# Patient Record
Sex: FEMALE | Race: WHITE | HISPANIC OR LATINO | URBAN - METROPOLITAN AREA
[De-identification: names, ages, dates, MRNs, and addresses within clinical notes are randomized per-mention and may not be internally consistent; named-entity substitution may affect disease eponyms.]

---
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
## 2022-03-31 ENCOUNTER — APPOINTMENT (OUTPATIENT)
Dept: NEUROLOGY | Facility: CLINIC | Age: 22
End: 2022-03-31
Payer: MEDICAID

## 2022-03-31 ENCOUNTER — NON-APPOINTMENT (OUTPATIENT)
Age: 22
End: 2022-03-31

## 2022-03-31 VITALS
HEART RATE: 84 BPM | DIASTOLIC BLOOD PRESSURE: 74 MMHG | WEIGHT: 158 LBS | SYSTOLIC BLOOD PRESSURE: 120 MMHG | BODY MASS INDEX: 29.08 KG/M2 | HEIGHT: 62 IN

## 2022-03-31 DIAGNOSIS — Z82.0 FAMILY HISTORY OF EPILEPSY AND OTHER DISEASES OF THE NERVOUS SYSTEM: ICD-10-CM

## 2022-03-31 PROCEDURE — 99205 OFFICE O/P NEW HI 60 MIN: CPT

## 2022-03-31 RX ORDER — LEVONORGESTREL AND ETHINYL ESTRADIOL 0.1-0.02MG
0.1-2 KIT ORAL DAILY
Qty: 1 | Refills: 3 | Status: DISCONTINUED | COMMUNITY
Start: 2019-11-19 | End: 2022-03-31

## 2022-03-31 RX ORDER — QUETIAPINE 50 MG/1
50 TABLET, FILM COATED ORAL
Refills: 0 | Status: ACTIVE | COMMUNITY

## 2022-03-31 RX ORDER — NORETHINDRONE AND ETHINYL ESTRADIOL 0.5-0.035
0.5-35 KIT ORAL DAILY
Qty: 1 | Refills: 3 | Status: DISCONTINUED | COMMUNITY
Start: 2020-01-14 | End: 2022-03-31

## 2022-03-31 RX ORDER — LEVONORGESTREL AND ETHINYL ESTRADIOL 0.1-0.02MG
0.1-2 KIT ORAL
Qty: 1 | Refills: 1 | Status: DISCONTINUED | COMMUNITY
Start: 2020-03-08 | End: 2022-03-31

## 2022-03-31 NOTE — HISTORY OF PRESENT ILLNESS
[FreeTextEntry1] : 21-year-old woman who is here for initial consultation of headache that started last year.\par Location unilateral or holocephalic\par Quality throbbing\par Severity 8 out of 10\par Frequency once a week\par Duration the whole night.  Patient states that sometimes it wakes her up from sleep.\par Associated with nausea, photophobia phonophobia, no TACs and no aura\par She has tried Tylenol, Motrin, Advil, Aleve

## 2022-03-31 NOTE — DISCUSSION/SUMMARY
[FreeTextEntry1] : 21-year-old woman who is here for initial consultation of headaches that are likely migraine without aura.  Patient was advised to try sumatriptan as needed.  Patient will also obtain MRI of the brain as these are new onset headaches with ability to wake her up in the morning.  Patient will follow up with me after the studies are completed.\par \par I spent the time noted on the day of this patient encounter preparing for, providing and documenting the above E/M service and counseling and educate patient on differential, workup, disease course, and treatment/management. Education was provided to the patient during this encounter. All questions and concerns were answered and addressed in detail. The patient verbalized understanding and agreed to plan. Patient was advised to continue to monitor for neurologic symptoms and to notify my office or go to the nearest emergency room if there are any changes. Any orders/referrals and communications were provided as well. \par Side effects of the above medications were discussed in detail including but not limited to applicable black box warning and teratogenicity as appropriate. \par Patient was advised to bring previous records to my office. \par \par \par

## 2022-04-06 ENCOUNTER — OUTPATIENT (OUTPATIENT)
Dept: OUTPATIENT SERVICES | Facility: HOSPITAL | Age: 22
LOS: 1 days | End: 2022-04-06
Payer: MEDICAID

## 2022-04-06 ENCOUNTER — APPOINTMENT (OUTPATIENT)
Dept: MRI IMAGING | Facility: HOSPITAL | Age: 22
End: 2022-04-06
Payer: MEDICAID

## 2022-04-06 ENCOUNTER — NON-APPOINTMENT (OUTPATIENT)
Age: 22
End: 2022-04-06

## 2022-04-06 DIAGNOSIS — R51.9 HEADACHE, UNSPECIFIED: ICD-10-CM

## 2022-04-06 PROCEDURE — 70551 MRI BRAIN STEM W/O DYE: CPT

## 2022-04-06 PROCEDURE — 70551 MRI BRAIN STEM W/O DYE: CPT | Mod: 26

## 2022-05-04 ENCOUNTER — APPOINTMENT (OUTPATIENT)
Dept: NEUROLOGY | Facility: CLINIC | Age: 22
End: 2022-05-04
Payer: MEDICAID

## 2022-05-04 VITALS
DIASTOLIC BLOOD PRESSURE: 80 MMHG | SYSTOLIC BLOOD PRESSURE: 118 MMHG | BODY MASS INDEX: 29.08 KG/M2 | HEIGHT: 62 IN | WEIGHT: 158 LBS | HEART RATE: 84 BPM

## 2022-05-04 DIAGNOSIS — R51.9 HEADACHE, UNSPECIFIED: ICD-10-CM

## 2022-05-04 PROCEDURE — 99215 OFFICE O/P EST HI 40 MIN: CPT

## 2022-05-04 RX ORDER — SUMATRIPTAN 50 MG/1
50 TABLET, FILM COATED ORAL
Qty: 1 | Refills: 3 | Status: ACTIVE | COMMUNITY
Start: 2022-03-31 | End: 1900-01-01

## 2022-05-04 NOTE — DISCUSSION/SUMMARY
[FreeTextEntry1] : 21-year-old woman who is here for follow-up of her headaches that are likely migraine without aura versus caffeine withdrawal headaches.  Patient will try a higher dose of sumatriptan 50 mg as needed.  If that does not work may need to try rizatriptan.  Patient was advised to cut down on amount of soda she drinks as caffeine withdrawal headaches might be another component of her headaches.  Patient will follow up with me in 3 months if not earlier.\par \par I spent the time noted on the day of this patient encounter preparing for, providing and documenting the above E/M service and counseling and educate patient on differential, workup, disease course, and treatment/management. Education was provided to the patient during this encounter. All questions and concerns were answered and addressed in detail. The patient verbalized understanding and agreed to plan. Patient was advised to continue to monitor for neurologic symptoms and to notify my office or go to the nearest emergency room if there are any changes. Any orders/referrals and communications were provided as well. \par Side effects of the above medications were discussed in detail including but not limited to applicable black box warning and teratogenicity as appropriate. \par Patient was advised to bring previous records to my office. \par \par \par

## 2022-05-04 NOTE — HISTORY OF PRESENT ILLNESS
[FreeTextEntry1] : 21-year-old woman who is here for initial consultation of headache that started last year.\par Location unilateral or holocephalic\par Quality throbbing\par Severity 8 out of 10\par Frequency once a week\par Duration the whole night.  Patient states that sometimes it wakes her up from sleep.\par Associated with nausea, photophobia phonophobia, no TACs and no aura\par She has tried Tylenol, Motrin, Advil, Aleve\par \par Interval history: Since her last visit patient's MRI of the brain was unremarkable.  Patient tried the Imitrex however it did not help alleviate her headache.  Patient experiences no side effects from the medication.  Patient states that the severity of her headache is not as bad as before.  The last one was about a week ago.  On further evaluation patient also drinks soda every day.  Patient was advised that another cause of her headache may be caffeine withdrawal headaches and she was advised to taper off the soda.

## 2022-05-09 ENCOUNTER — APPOINTMENT (OUTPATIENT)
Dept: OBGYN | Facility: CLINIC | Age: 22
End: 2022-05-09
Payer: MEDICAID

## 2022-05-09 ENCOUNTER — TRANSCRIPTION ENCOUNTER (OUTPATIENT)
Age: 22
End: 2022-05-09

## 2022-05-09 ENCOUNTER — NON-APPOINTMENT (OUTPATIENT)
Age: 22
End: 2022-05-09

## 2022-05-09 VITALS
SYSTOLIC BLOOD PRESSURE: 120 MMHG | WEIGHT: 158 LBS | OXYGEN SATURATION: 98 % | TEMPERATURE: 98 F | BODY MASS INDEX: 29.08 KG/M2 | HEART RATE: 86 BPM | DIASTOLIC BLOOD PRESSURE: 60 MMHG | HEIGHT: 62 IN

## 2022-05-09 DIAGNOSIS — F52.5 VAGINISMUS NOT DUE TO A SUBSTANCE OR KNOWN PHYSIOLOGICAL CONDITION: ICD-10-CM

## 2022-05-09 DIAGNOSIS — N94.2 VAGINISMUS: ICD-10-CM

## 2022-05-09 LAB
HCG UR QL: NEGATIVE
QUALITY CONTROL: YES

## 2022-05-09 PROCEDURE — 99395 PREV VISIT EST AGE 18-39: CPT

## 2022-06-06 NOTE — HISTORY OF PRESENT ILLNESS
[FreeTextEntry1] : 20 yo P0 with history of pelvic pain with intercourse. Specifically, feels pain with initial penetration and even has difficulty with insertion of tampons. Reviewed lubrication and correct insertion of tampon.\par \par vaginismus? stars, \par POB:denies\par PGYN: reg\par PMH: denies\par PSH: denies\par Med: per MAR\par All: nkma\par SH: denies\par \par

## 2022-06-06 NOTE — PHYSICAL EXAM
[Chaperone Present] : A chaperone was present in the examining room during all aspects of the physical examination [Appropriately responsive] : appropriately responsive [Alert] : alert [No Acute Distress] : no acute distress [Soft] : soft [Non-tender] : non-tender [Non-distended] : non-distended [No HSM] : No HSM [No Lesions] : no lesions [No Mass] : no mass [Oriented x3] : oriented x3 [Labia Majora] : normal [Labia Minora] : normal [Normal] : normal [Uterine Adnexae] : normal [FreeTextEntry4] : tenderness at introitus

## 2022-06-06 NOTE — PLAN
[FreeTextEntry1] : \par \par I spent the time noted on the day of this patient encounter preparing for, providing and documenting the above E/M service and counseling and educate patient on differential, workup, disease course, and treatment/management. Education was provided to the patient during this encounter. All questions and concerns were answered and addressed in detail.\par \par Violette Dupont MD\par

## 2022-08-04 ENCOUNTER — APPOINTMENT (OUTPATIENT)
Dept: NEUROLOGY | Facility: CLINIC | Age: 22
End: 2022-08-04

## 2022-08-09 ENCOUNTER — APPOINTMENT (OUTPATIENT)
Dept: OBGYN | Facility: CLINIC | Age: 22
End: 2022-08-09

## 2022-09-16 ENCOUNTER — EMERGENCY (EMERGENCY)
Facility: HOSPITAL | Age: 22
LOS: 1 days | Discharge: HOME | End: 2022-09-16
Attending: INTERNAL MEDICINE | Admitting: INTERNAL MEDICINE
Payer: MEDICAID

## 2022-09-16 VITALS
HEART RATE: 80 BPM | DIASTOLIC BLOOD PRESSURE: 91 MMHG | RESPIRATION RATE: 16 BRPM | SYSTOLIC BLOOD PRESSURE: 134 MMHG | OXYGEN SATURATION: 99 %

## 2022-09-16 VITALS
RESPIRATION RATE: 18 BRPM | SYSTOLIC BLOOD PRESSURE: 155 MMHG | HEART RATE: 87 BPM | DIASTOLIC BLOOD PRESSURE: 78 MMHG | WEIGHT: 164.91 LBS | TEMPERATURE: 98 F | HEIGHT: 62 IN | OXYGEN SATURATION: 99 %

## 2022-09-16 PROCEDURE — 71045 X-RAY EXAM CHEST 1 VIEW: CPT | Mod: 26

## 2022-09-16 PROCEDURE — 99284 EMERGENCY DEPT VISIT MOD MDM: CPT

## 2022-09-16 PROCEDURE — 72040 X-RAY EXAM NECK SPINE 2-3 VW: CPT | Mod: 26

## 2022-09-16 PROCEDURE — 93005 ELECTROCARDIOGRAM TRACING: CPT

## 2022-09-16 PROCEDURE — 72040 X-RAY EXAM NECK SPINE 2-3 VW: CPT

## 2022-09-16 PROCEDURE — 71045 X-RAY EXAM CHEST 1 VIEW: CPT

## 2022-09-16 PROCEDURE — 93010 ELECTROCARDIOGRAM REPORT: CPT

## 2022-09-16 RX ORDER — IBUPROFEN 200 MG
1 TABLET ORAL
Qty: 40 | Refills: 0
Start: 2022-09-16 | End: 2022-09-25

## 2022-09-16 RX ORDER — IBUPROFEN 200 MG
600 TABLET ORAL ONCE
Refills: 0 | Status: COMPLETED | OUTPATIENT
Start: 2022-09-16 | End: 2022-09-16

## 2022-09-16 RX ORDER — LIDOCAINE 4 G/100G
1 CREAM TOPICAL
Qty: 20 | Refills: 0
Start: 2022-09-16 | End: 2022-09-25

## 2022-09-16 RX ORDER — LIDOCAINE 4 G/100G
1 CREAM TOPICAL ONCE
Refills: 0 | Status: COMPLETED | OUTPATIENT
Start: 2022-09-16 | End: 2022-09-16

## 2022-09-16 RX ADMIN — LIDOCAINE 1 PATCH: 4 CREAM TOPICAL at 17:09

## 2022-09-16 RX ADMIN — Medication 600 MILLIGRAM(S): at 15:39

## 2022-09-16 NOTE — ED PROVIDER NOTE - CARE PROVIDER_API CALL
Gustavo Abdalla)  Orthopaedic Sports Medicine; Orthopaedic Surgery  [address] [address]  Phone: [phone]  Fax: [phone]  Follow Up Time:

## 2022-09-16 NOTE — ED PROVIDER NOTE - OBJECTIVE STATEMENT
21 y f cc L UL pain L chest flickering  1 week hx of psychiatric problen depression on lexapro and seraquel , no trauma

## 2022-09-16 NOTE — ED PROVIDER NOTE - PHYSICAL EXAMINATION
General:     NAD, well-nourished, well-appearing  Head:     NC/AT, EOMI, oral mucosa moist  Neck:     trachea midline  Lungs:     CTA b/l, no w/r/r  CVS:     S1S2, RRR, no m/g/r  Abd:     +BS, s/nt/nd, no organomegaly  Ext:    2+ radial and pedal pulses, no c/c/e  L lateral ant elbow tenderness  Neuro: AAOx3, no sensory/motor deficits

## 2022-09-16 NOTE — ED ADULT NURSE NOTE - OBJECTIVE STATEMENT
left arm pain for 1 week, intermittent and also reporting tingling, denies injury. Skin warm dry color pink. good ROM. = Strength

## 2022-09-16 NOTE — ED PROVIDER NOTE - CARE PLAN
Principal Discharge DX:	Atypical chest pain  Secondary Diagnosis:	Tennis elbow  Secondary Diagnosis:	Cervical radiculopathy   1

## 2022-09-16 NOTE — ED PROVIDER NOTE - PATIENT PORTAL LINK FT
You can access the FollowMyHealth Patient Portal offered by [facility] by registering at the following website: [email or web address]. By joining [employer]’s FollowMyHealth portal, you will also be able to view your health information using other applications (apps) compatible with our system.

## 2022-09-16 NOTE — ED PROVIDER NOTE - NSFOLLOWUPINSTRUCTIONS_ED_ALL_ED_FT
Follow up with your PMD within 1-2 days.  Rest, increase your fluids, advance your activity as tolerated.   Take all of your other medications as previously prescribed.   Worsening, continued or ANY new concerning symptoms return to the emergency department.  warm compressed arm   motrin, lidocaine patch

## 2022-10-21 NOTE — ED ADULT NURSE NOTE - HISTORY OF COVID-19 VACCINATION
Impression: Open angle with borderline findings, low risk, bilateral
No FHx No Sulfa allergies 24-2 HVF
   OD:
   OS: 
RNFL OCT (10/21/22) OD: BL temporal, others WNL
   OS: WNL Pachs (10/21/22) OD: 484 OS: 490 Plan: Discussed findings, IOP's 16/16 RTC 6mo for IOP and possible 24-2HVF No

## 2023-04-26 NOTE — ED BEHAVIORAL HEALTH ASSESSMENT NOTE - DESCRIPTION
XR WRIST 3 OR MORE VIEWS RIGHT    Result Date: 4/26/2023  Narrative: DICTATING PHYSICIAN:  Beto Gavin M.D. EXAM:  XR WRIST 3 OR MORE VIEWS RIGHT, 4/26/2023 12:40 PM HISTORY: Wrist pain after fall. COMPARISON: None.     Impression: FINDINGS/IMPRESSION: Diffuse osseus demineralization limits evaluation.  Questionable osseous fragment projecting over the dorsum of the midcarpal joint.  Findings could represent triquetral avulsion fracture versus heterotopic bone, loose body or chondrocalcinosis.  Otherwise, no acute fracture or dislocation.  No destructive osseous lesions.  Severe osteoarthrosis at the thumb carpometacarpal and triscaphe joints.  Chondrocalcinosis at the TFCC, radiocarpal and midcarpal joints which can be seen in CPPD..  Severe calcified radial and ulnar artery atherosclerosis. Electronically Signed by: BETO GAVIN MD Signed on: 4/26/2023 1:05 PM Workstation ID: [id number]    CT CHEST WO CONTRAST    Result Date: 4/26/2023  Narrative: EXAM: CT CHEST WO CONTRAST CLINICAL INDICATION: Fall.  Rib fracture.  Chest trauma. COMPARISON:  None. TECHNIQUE: Noncontrast axial images of the chest are obtained.  MA and/or kVp was adjusted for patient size. FINDINGS: Right shoulder advanced arthritic changes are seen.  There is no axillary lymphadenopathy seen.  Left subclavian pacemaker device noted. Median sternotomy changes.  The heart size is within normal limits. Coronary artery calcification seen.  Ascending aorta measures 4 cm in diameter.  The esophagus is mildly distended with small air-fluid level noted.  A suture line in the mid esophagus is seen.  Findings suggests gastric pull-up.  No adrenal mass.  Bilateral renal cysts.  Moderate colonic stool.  Colonic diverticulosis.  No focal lesions in the pancreas seen.  A duodenal diverticulum is seen. The trachea and the central bronchi are patent.  Minimal biapical pleuroparenchymal thickening is seen.  Lateral right upper lobe nodule measures  1.5 mm on image 20.  Bilateral lower lobe interstitial opacities are noted.  Right lower lobe subsegmental atelectasis.  7.5 mm right lower lobe pleural-based nodule at the right costophrenic angle seen on image 100.  Small left pleural effusion.  Left lower lobe interstitial opacities are noted.  Groundglass changes are noted.  No pneumothorax is seen. No compression deformity in the visualized spine seen.  Mild right convex thoracic scoliosis is seen.  Old healed right 7th rib fracture.  No acute displaced rib fracture is seen.     Impression: Old right rib fractures seen.  No acute displaced rib fracture is seen. The esophagus is mildly distended with small air-fluid level noted.  A suture line in the mid esophagus is seen.  Findings suggests gastric pull-up.  7.5 mm right lower lobe pleural-based nodule at the right costophrenic angle seen on image 100.  Small left pleural effusion.  Electronically Signed by: JASON JACKSON MD Signed on: 4/26/2023 9:33 AM Workstation ID: [id number]    XR SHOULDER 2 VIEWS RIGHT    Result Date: 4/26/2023  Narrative: DICTATING PHYSICIAN:  Beto Couch M.D. EXAM:  XR SHOULDER 2 VIEWS RIGHT, 4/26/2023 8:26 AM HISTORY: fall COMPARISON: None.     Impression: FINDINGS/IMPRESSION: Evaluation is limited by nonstandard positioning.  Severe diffuse osseous demineralization.  No radiographically apparent fracture.  Suspected superior translation of the humeral head relative to the glenoid with impaction and remodeling of the undersurface of the acromion, consistent with sequela of chronic rotator cuff arthropathy.  Evaluation of the glenohumeral joint is limited, however advanced osteoarthrosis is suspected.  Moderate osteoarthrosis at the acromioclavicular joint.  Soft tissues are unremarkable. Electronically Signed by: BETO COUCH MD Signed on: 4/26/2023 9:26 AM Workstation ID: [id number]    CT HEAD WO CONTRAST    Result Date: 4/26/2023  Narrative: EXAMINATION: Computed  tomography (CT) of the head without contrast HISTORY: Head pain after trauma. TECHNIQUE: CT of the head was performed without contrast according to standard protocol. COMPARISON: No prior study available for comparison. FINDINGS: No acute intra- or extra-axial fluid collections are identified. There is mild cerebral volume loss with associated ex vacuo ventricular enlargement. The basilar cisterns are patent. No mass effect or midline shift is seen. There is an area of encephalomalacia in the right occipital lobe likely related to a chronic infarction in the right posterior cerebral artery territory. There is an area of hypoattenuation in the posterior right parietal lobe which is less specific and may represent gliosis rather than vasogenic edema. There is vascular calcification of the carotid siphons. There is an acute appearing right zygomatic maxillary complex (ZMC) fracture comprising of fractures of the right orbital inferior and lateral walls, maxillary sinus anterior and posterior walls, and zygomatic arch. There is hemorrhagic fluid in the right maxillary sinus. No acute intraorbital injury is identified. Bilateral cataract extractions are noted. The mastoids appear normal No acute fracture is identified.     Impression: 1. No acute intracranial hemorrhage. 2. Acute right zygomaticomaxillary complex (ZMC) fractures including fractures of the right orbital and maxillary sinus walls. No intraorbital injury. 3. Encephalomalacia in the right occipital lobe likely related to a chronic infarction. Hypoattenuation in the posterior right parietal lobe likely from gliosis. Given unusual appearance, recommend correlation with prior outside imaging or non-urgent MRI. Electronically Signed by: SONJA DALE MD Signed on: 4/26/2023 7:20 AM Workstation ID: [id number]    CT FACIAL BONES WO CONTRAST    Result Date: 4/26/2023  Narrative: Patient: GEGE BAILEY  Time Out: 03:34 Exam(s): CT FACIAL Without Contrast EXAM:    CT Maxillofacial Without Intravenous Contrast CLINICAL HISTORY:    Reason for exam: Facial trauma.. TECHNIQUE:   Axial computed tomography images of the face without intravenous contrast.  Dose reduction techniques were utilized. COMPARISON:   No relevant prior studies available. FINDINGS:   Bones/joints:  Nondisplaced fracture of the right zygomatic arch.  Fractures of the floor and lateral wall of the right orbit.  Fractures of the anterior and posterior lateral walls of the right maxillary antrum.   Soft tissues:  Unremarkable.   Orbits:  Unremarkable.   Sinuses:  Fluid is seen in the right maxillary sinus.     Impression: Fractures of the right zygomatic arch, right lateral orbit wall, right orbital floor and walls of the right maxillary sinus. Electronically signed by Pranav Soriano MD on 04 26 23 at 03:34    XR ELBOW 2 VIEWS RIGHT    Result Date: 4/26/2023  Narrative: Patient: GEGE BAILEY  Time Out: 01:53 Exam(s): XR RIGHT ELBOW EXAM:   XR Right Elbow Complete, 3 or More Views CLINICAL HISTORY:    Reason for exam: fall. TECHNIQUE:   Frontal, lateral and oblique views of the right elbow. COMPARISON:   No relevant prior studies available. FINDINGS:   Bones/joints:  Joint effusion.  No acute fracture is clearly identified.   No dislocation.   Soft tissues:  Unremarkable.     Impression: Joint effusion.  No clear evidence of acute fracture. Electronically signed by Pranav Soriano MD on 04 26 23 at 01:53    XR CHEST AP OR PA    Result Date: 4/26/2023  Narrative: Patient: GEGE BAILEY  Time Out: 01:51 Exam(s): XR CXR 1 VIEW EXAM:   XR Chest, 1 View CLINICAL HISTORY:    Reason for exam: fall. TECHNIQUE:   Frontal view of the chest. COMPARISON:   No relevant prior studies available. FINDINGS:   Lungs:  Unremarkable.  No acute infiltration, atelectasis or mass.   Pleural space:  Unremarkable.  No pneumothorax or pleural fluid.   Heart:  Unremarkable.  No cardiomegaly.   Mediastinum:  Unremarkable.    Bones/joints:  Bilateral loss of acromiohumeral spaces in the shoulders likely indicating chronic rotator cuff pathology.   Vasculature:  The aortic knob is prominent.   Tubes, lines and devices:  There is an implanted cardiac pacer defibrillator.     Impression:   No acute findings in the chest. Electronically signed by Pranav Soriano MD on 04 26 23 at 01:51     Labs Reviewed   BASIC METABOLIC PANEL - Abnormal; Notable for the following components:       Result Value    Glucose 106 (*)     BUN 24 (*)     BUN/Cr 32 (*)     All other components within normal limits   CBC WITH AUTOMATED DIFFERENTIAL (PERFORMABLE ONLY) - Abnormal; Notable for the following components:    RBC 4.35 (*)     .5 (*)     RDW-SD 50.1 (*)     Absolute Lymphocytes 0.9 (*)     All other components within normal limits    Narrative:     This is an appended report.  These results have been appended to a previously verified report.   PROTHROMBIN TIME - Normal   TROPONIN I, HIGH SENSITIVITY - Normal   CBC WITH DIFFERENTIAL    Narrative:     The following orders were created for panel order CBC with Automated Differential.  Procedure                               Abnormality         Status                     ---------                               -----------         ------                     CBC with Automated Dif...[[id number]]  Abnormal            Final result                 Please view results for these tests on the individual orders.   TYPE/SCREEN   ABO/RH CONFIRMATION        calm, cooperative, engaged    Vital Signs Last 24 Hrs  T(C): 37.3 (03 Jan 2018 13:27), Max: 37.3 (03 Jan 2018 13:27)  T(F): 99.1 (03 Jan 2018 13:27), Max: 99.1 (03 Jan 2018 13:27)  HR: 92 (03 Jan 2018 13:27) (92 - 92)  BP: 130/83 (03 Jan 2018 13:27) (130/83 - 130/83)  BP(mean): --  RR: 22 (03 Jan 2018 13:27) (22 - 22)  SpO2: 100% (03 Jan 2018 13:27) (100% - 100%) denies lives with parents

## 2023-05-03 ENCOUNTER — EMERGENCY (EMERGENCY)
Facility: HOSPITAL | Age: 23
LOS: 1 days | Discharge: HOME | End: 2023-05-03
Attending: STUDENT IN AN ORGANIZED HEALTH CARE EDUCATION/TRAINING PROGRAM | Admitting: STUDENT IN AN ORGANIZED HEALTH CARE EDUCATION/TRAINING PROGRAM
Payer: MEDICAID

## 2023-05-03 VITALS
WEIGHT: 166.89 LBS | SYSTOLIC BLOOD PRESSURE: 136 MMHG | HEIGHT: 62 IN | DIASTOLIC BLOOD PRESSURE: 89 MMHG | RESPIRATION RATE: 16 BRPM | TEMPERATURE: 98 F | HEART RATE: 83 BPM | OXYGEN SATURATION: 98 %

## 2023-05-03 PROBLEM — F32.9 MAJOR DEPRESSIVE DISORDER, SINGLE EPISODE, UNSPECIFIED: Chronic | Status: ACTIVE | Noted: 2022-09-16

## 2023-05-03 PROBLEM — F41.9 ANXIETY DISORDER, UNSPECIFIED: Chronic | Status: ACTIVE | Noted: 2022-09-16

## 2023-05-03 PROCEDURE — 99284 EMERGENCY DEPT VISIT MOD MDM: CPT

## 2023-05-03 PROCEDURE — 70486 CT MAXILLOFACIAL W/O DYE: CPT | Mod: 26,MA

## 2023-05-03 PROCEDURE — 99284 EMERGENCY DEPT VISIT MOD MDM: CPT | Mod: 25

## 2023-05-03 PROCEDURE — 70486 CT MAXILLOFACIAL W/O DYE: CPT | Mod: MA

## 2023-05-03 RX ORDER — SERTRALINE 25 MG/1
0 TABLET, FILM COATED ORAL
Qty: 0 | Refills: 0 | DISCHARGE

## 2023-05-03 RX ORDER — QUETIAPINE FUMARATE 200 MG/1
0 TABLET, FILM COATED ORAL
Qty: 0 | Refills: 0 | DISCHARGE

## 2023-05-03 NOTE — ED PROVIDER NOTE - CLINICAL SUMMARY MEDICAL DECISION MAKING FREE TEXT BOX
CT results discussed, patient to f/u with PCP for outpatient MRI   No acute traumatic injuries  contusion to left cheek

## 2023-05-03 NOTE — ED PROVIDER NOTE - PHYSICAL EXAMINATION
VITAL SIGNS: I have reviewed nursing notes and confirm.  CONSTITUTIONAL: well-appearing, non-toxic, NAD  SKIN: Warm dry, normal skin turgor  HEAD: NC, L cheek contusion   CARD: RRR, no murmurs, rubs or gallops  RESP: clear to ausculation b/l.  No rales, rhonchi, or wheezing.  ABD: soft, + BS, non-tender, non-distended, no rebound or guarding. No CVA tenderness  EXT: Full ROM, no bony tenderness, no pedal edema, no calf tenderness  NEURO: normal motor. normal sensory. CN II-XII intact. Cerebellar testing normal. Normal gait.  PSYCH: Cooperative, appropriate.

## 2023-05-03 NOTE — ED PROVIDER NOTE - PATIENT PORTAL LINK FT
You can access the FollowMyHealth Patient Portal offered by [facility] by registering at the following website: [email or web address]. By joining [employer]’s FollowMyHealth portal, you will also be able to view your health information using other applications (apps) compatible with our system.
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
## 2023-12-03 ENCOUNTER — NON-APPOINTMENT (OUTPATIENT)
Age: 23
End: 2023-12-03

## 2024-01-15 ENCOUNTER — NON-APPOINTMENT (OUTPATIENT)
Age: 24
End: 2024-01-15

## 2024-02-02 ENCOUNTER — EMERGENCY (EMERGENCY)
Facility: HOSPITAL | Age: 24
LOS: 1 days | Discharge: HOME | End: 2024-02-02
Attending: EMERGENCY MEDICINE | Admitting: EMERGENCY MEDICINE
Payer: COMMERCIAL

## 2024-02-02 VITALS
WEIGHT: 167.55 LBS | RESPIRATION RATE: 18 BRPM | SYSTOLIC BLOOD PRESSURE: 145 MMHG | HEART RATE: 96 BPM | HEIGHT: 62 IN | TEMPERATURE: 98 F | OXYGEN SATURATION: 98 % | DIASTOLIC BLOOD PRESSURE: 99 MMHG

## 2024-02-02 LAB
ALBUMIN SERPL ELPH-MCNC: 4 G/DL — SIGNIFICANT CHANGE UP (ref 3.3–5)
ALP SERPL-CCNC: 99 U/L — SIGNIFICANT CHANGE UP (ref 40–120)
ALT FLD-CCNC: 23 U/L — SIGNIFICANT CHANGE UP (ref 10–45)
ANION GAP SERPL CALC-SCNC: 10 MMOL/L — SIGNIFICANT CHANGE UP (ref 5–17)
APPEARANCE UR: ABNORMAL
AST SERPL-CCNC: 22 U/L — SIGNIFICANT CHANGE UP (ref 10–40)
BASOPHILS # BLD AUTO: 0.07 K/UL — SIGNIFICANT CHANGE UP (ref 0–0.2)
BASOPHILS NFR BLD AUTO: 0.7 % — SIGNIFICANT CHANGE UP (ref 0–2)
BILIRUB SERPL-MCNC: 0.3 MG/DL — SIGNIFICANT CHANGE UP (ref 0.2–1.2)
BILIRUB UR-MCNC: NEGATIVE — SIGNIFICANT CHANGE UP
BUN SERPL-MCNC: 14 MG/DL — SIGNIFICANT CHANGE UP (ref 7–23)
CALCIUM SERPL-MCNC: 9.4 MG/DL — SIGNIFICANT CHANGE UP (ref 8.4–10.5)
CHLORIDE SERPL-SCNC: 104 MMOL/L — SIGNIFICANT CHANGE UP (ref 96–108)
CO2 SERPL-SCNC: 27 MMOL/L — SIGNIFICANT CHANGE UP (ref 22–31)
COLOR SPEC: YELLOW — SIGNIFICANT CHANGE UP
CREAT SERPL-MCNC: 0.45 MG/DL — LOW (ref 0.5–1.3)
DIFF PNL FLD: ABNORMAL
EGFR: 140 ML/MIN/1.73M2 — SIGNIFICANT CHANGE UP
EOSINOPHIL # BLD AUTO: 0.08 K/UL — SIGNIFICANT CHANGE UP (ref 0–0.5)
EOSINOPHIL NFR BLD AUTO: 0.8 % — SIGNIFICANT CHANGE UP (ref 0–6)
GLUCOSE SERPL-MCNC: 86 MG/DL — SIGNIFICANT CHANGE UP (ref 70–99)
GLUCOSE UR QL: NEGATIVE MG/DL — SIGNIFICANT CHANGE UP
HCT VFR BLD CALC: 34 % — LOW (ref 34.5–45)
HGB BLD-MCNC: 11.2 G/DL — LOW (ref 11.5–15.5)
HIV 1 & 2 AB SERPL IA.RAPID: SIGNIFICANT CHANGE UP
IMM GRANULOCYTES NFR BLD AUTO: 0.3 % — SIGNIFICANT CHANGE UP (ref 0–0.9)
KETONES UR-MCNC: NEGATIVE MG/DL — SIGNIFICANT CHANGE UP
LEUKOCYTE ESTERASE UR-ACNC: ABNORMAL
LIDOCAIN IGE QN: 32 U/L — SIGNIFICANT CHANGE UP (ref 16–77)
LYMPHOCYTES # BLD AUTO: 2.36 K/UL — SIGNIFICANT CHANGE UP (ref 1–3.3)
LYMPHOCYTES # BLD AUTO: 23.8 % — SIGNIFICANT CHANGE UP (ref 13–44)
MCHC RBC-ENTMCNC: 24.7 PG — LOW (ref 27–34)
MCHC RBC-ENTMCNC: 32.9 GM/DL — SIGNIFICANT CHANGE UP (ref 32–36)
MCV RBC AUTO: 75.1 FL — LOW (ref 80–100)
MONOCYTES # BLD AUTO: 0.58 K/UL — SIGNIFICANT CHANGE UP (ref 0–0.9)
MONOCYTES NFR BLD AUTO: 5.8 % — SIGNIFICANT CHANGE UP (ref 2–14)
NEUTROPHILS # BLD AUTO: 6.8 K/UL — SIGNIFICANT CHANGE UP (ref 1.8–7.4)
NEUTROPHILS NFR BLD AUTO: 68.6 % — SIGNIFICANT CHANGE UP (ref 43–77)
NITRITE UR-MCNC: NEGATIVE — SIGNIFICANT CHANGE UP
NRBC # BLD: 0 /100 WBCS — SIGNIFICANT CHANGE UP (ref 0–0)
PH UR: 5.5 — SIGNIFICANT CHANGE UP (ref 5–8)
PLATELET # BLD AUTO: 314 K/UL — SIGNIFICANT CHANGE UP (ref 150–400)
POTASSIUM SERPL-MCNC: 3.7 MMOL/L — SIGNIFICANT CHANGE UP (ref 3.5–5.3)
POTASSIUM SERPL-SCNC: 3.7 MMOL/L — SIGNIFICANT CHANGE UP (ref 3.5–5.3)
PROT SERPL-MCNC: 8.4 G/DL — HIGH (ref 6–8.3)
PROT UR-MCNC: NEGATIVE MG/DL — SIGNIFICANT CHANGE UP
RBC # BLD: 4.53 M/UL — SIGNIFICANT CHANGE UP (ref 3.8–5.2)
RBC # FLD: 14.5 % — SIGNIFICANT CHANGE UP (ref 10.3–14.5)
SODIUM SERPL-SCNC: 141 MMOL/L — SIGNIFICANT CHANGE UP (ref 135–145)
SP GR SPEC: 1.02 — SIGNIFICANT CHANGE UP (ref 1–1.03)
UROBILINOGEN FLD QL: 0.2 MG/DL — SIGNIFICANT CHANGE UP (ref 0.2–1)
WBC # BLD: 9.92 K/UL — SIGNIFICANT CHANGE UP (ref 3.8–10.5)
WBC # FLD AUTO: 9.92 K/UL — SIGNIFICANT CHANGE UP (ref 3.8–10.5)

## 2024-02-02 PROCEDURE — 76830 TRANSVAGINAL US NON-OB: CPT | Mod: 26

## 2024-02-02 PROCEDURE — 96375 TX/PRO/DX INJ NEW DRUG ADDON: CPT

## 2024-02-02 PROCEDURE — 76830 TRANSVAGINAL US NON-OB: CPT

## 2024-02-02 PROCEDURE — 99284 EMERGENCY DEPT VISIT MOD MDM: CPT | Mod: 25

## 2024-02-02 PROCEDURE — 80053 COMPREHEN METABOLIC PANEL: CPT

## 2024-02-02 PROCEDURE — 86703 HIV-1/HIV-2 1 RESULT ANTBDY: CPT

## 2024-02-02 PROCEDURE — 36415 COLL VENOUS BLD VENIPUNCTURE: CPT

## 2024-02-02 PROCEDURE — 96374 THER/PROPH/DIAG INJ IV PUSH: CPT

## 2024-02-02 PROCEDURE — 85025 COMPLETE CBC W/AUTO DIFF WBC: CPT

## 2024-02-02 PROCEDURE — 81001 URINALYSIS AUTO W/SCOPE: CPT

## 2024-02-02 PROCEDURE — 83690 ASSAY OF LIPASE: CPT

## 2024-02-02 PROCEDURE — 81025 URINE PREGNANCY TEST: CPT

## 2024-02-02 PROCEDURE — 99284 EMERGENCY DEPT VISIT MOD MDM: CPT

## 2024-02-02 RX ORDER — ONDANSETRON 8 MG/1
4 TABLET, FILM COATED ORAL ONCE
Refills: 0 | Status: COMPLETED | OUTPATIENT
Start: 2024-02-02 | End: 2024-02-02

## 2024-02-02 RX ORDER — SODIUM CHLORIDE 9 MG/ML
1000 INJECTION INTRAMUSCULAR; INTRAVENOUS; SUBCUTANEOUS ONCE
Refills: 0 | Status: COMPLETED | OUTPATIENT
Start: 2024-02-02 | End: 2024-02-02

## 2024-02-02 RX ORDER — KETOROLAC TROMETHAMINE 30 MG/ML
30 SYRINGE (ML) INJECTION ONCE
Refills: 0 | Status: DISCONTINUED | OUTPATIENT
Start: 2024-02-02 | End: 2024-02-02

## 2024-02-02 RX ADMIN — Medication 30 MILLIGRAM(S): at 22:29

## 2024-02-02 RX ADMIN — SODIUM CHLORIDE 1000 MILLILITER(S): 9 INJECTION INTRAMUSCULAR; INTRAVENOUS; SUBCUTANEOUS at 22:17

## 2024-02-02 RX ADMIN — ONDANSETRON 4 MILLIGRAM(S): 8 TABLET, FILM COATED ORAL at 22:29

## 2024-02-02 RX ADMIN — Medication 30 MILLIGRAM(S): at 22:46

## 2024-02-02 NOTE — ED ADULT NURSE NOTE - NSFALLUNIVINTERV_ED_ALL_ED
Bed/Stretcher in lowest position, wheels locked, appropriate side rails in place/Call bell, personal items and telephone in reach/Instruct patient to call for assistance before getting out of bed/chair/stretcher/Non-slip footwear applied when patient is off stretcher/[place] to call system/Physically safe environment - no spills, clutter or unnecessary equipment/Purposeful proactive rounding/Room/bathroom lighting operational, light cord in reach

## 2024-02-02 NOTE — ED PROVIDER NOTE - PHYSICAL EXAMINATION
exam:   General: well appearing, NAD.   HEENT: eyes perrl, nose normal, OP no erythema/exudate/swelling.   cor: RRR, s1s2, 2+rad pulses.   lungs: ctabl, no resp distress.   abd: soft, nondistended. slight LLQ ttp. no rebound/guarding. no cvat  neuro: a&ox3, cn2-12 intact, COLINDRES, 5/5 strength c nl sensation all extremities, nl coordination.   MSK: no extremity swelling.  Skin: normal, no rash

## 2024-02-02 NOTE — ED PROVIDER NOTE - OBJECTIVE STATEMENT
23-year-old female with no medical or surgical history complaining of acute onset left lower quadrant sharp pain at 6 PM tonight.  Associated with mild nausea.  No vomiting or diarrhea.  Had normal bowel movement today.  No hematuria dysuria no vaginal bleeding or discharge.  Denies pregnancy.  LMP 1/16.  No fever or chills.  No chest pain or shortness of breath.

## 2024-02-02 NOTE — ED ADULT NURSE NOTE - OBJECTIVE STATEMENT
pt presents to ED c/o LLQ since 6pm associated with nausea. .My  LMP is Jan 16" pt presents to ED c/o LLQ since 6pm associated with nausea. LMP is Jan 16. denies any cp, sob, fevers

## 2024-02-02 NOTE — ED PROVIDER NOTE - CLINICAL SUMMARY MEDICAL DECISION MAKING FREE TEXT BOX
23-year-old female with no medical or surgical history complaining of acute onset left lower quadrant sharp pain at 6 PM tonight.  Associated with mild nausea.  No vomiting or diarrhea.  Had normal bowel movement today.  No hematuria dysuria no vaginal bleeding or discharge.  Denies pregnancy.  LMP 1/16.  No fever or chills.  No chest pain or shortness of breath.    Patient well-appearing in no distress.  Vitals unremarkable.  Patient has slight left lower quadrant tenderness on exam.  Partial DDx: Ovarian cyst, ovarian torsion, cystitis colitis.  Check labs, urine, pelvic sonogram.  Toradol and Zofran for symptoms.  Reassess 23-year-old female with no medical or surgical history complaining of acute onset left lower quadrant sharp pain at 6 PM tonight.  Associated with mild nausea.  No vomiting or diarrhea.  Had normal bowel movement today.  No hematuria dysuria no vaginal bleeding or discharge.  Denies pregnancy.  LMP 1/16.  No fever or chills.  No chest pain or shortness of breath.    Patient well-appearing in no distress.  Vitals unremarkable.  Patient has slight left lower quadrant tenderness on exam.  Partial DDx: Ovarian cyst, ovarian torsion, cystitis colitis.  Check labs, urine, pelvic sonogram.  Toradol and Zofran for symptoms.  Reassess    Update: Labs unremarkable.  Ultrasound shows left ovarian cyst likely hemorrhagic.  Normal blood flow.  Patient states she is feeling much better after meds.  Abdomen currently nontender.  Follow-up with OB GYN

## 2024-02-02 NOTE — ED PROVIDER NOTE - NSFOLLOWUPINSTRUCTIONS_ED_ALL_ED_FT
-Take ibuprofen 400 to 600 mg every 6 hours as needed for pain  -Follow-up with your gynecologist in the next 1 to 2 weeks regarding left ovarian cyst  -  -Return to any emergency room for worsening pain or other concerns      Ovarian Cyst    WHAT YOU NEED TO KNOW:    An ovarian cyst is a fluid-filled sac that grows in or on an ovary. You have 2 ovaries, 1 on each side of your uterus. They are small, about the shape of an almond. Ovarian cysts are common in women who have regular monthly cycles. During your monthly cycle, eggs are released from the ovaries. The cyst usually contains fluid but may sometimes have blood or tissue in it. Most ovarian cysts are harmless and go away without treatment in a few months. Some cysts can grow large, cause pain, or break open.   Female Reproductive System         DISCHARGE INSTRUCTIONS:    Call your local emergency number (911 in the [place]) if:   •You have severe pain with fever and vomiting.      •You have sudden, severe abdominal pain.      •You are too weak, faint, or dizzy to stand up.      •You are breathing very quickly.      Call your doctor or gynecologist if:   •Your periods are early, late, or more painful than usual.      •You have questions or concerns about your condition or care.      Medicines: You may need any of the following:   •Birth control pills may help control your monthly cycle, prevent cysts, or cause them to shrink.      •Acetaminophen decreases pain and fever. It is available without a doctor's order. Ask how much to take and how often to take it. Follow directions. Read the labels of all other medicines you are using to see if they also contain acetaminophen, or ask your doctor or pharmacist. Acetaminophen can cause liver damage if not taken correctly.      •NSAIDs, such as ibuprofen, help decrease swelling, pain, and fever. This medicine is available with or without a doctor's order. NSAIDs can cause stomach bleeding or kidney problems in certain people. If you take blood thinner medicine, always ask your healthcare provider if NSAIDs are safe for you. Always read the medicine label and follow directions.      •Prescription pain medicine may be given. Ask your healthcare provider how to take this medicine safely. Some prescription pain medicines contain acetaminophen. Do not take other medicines that contain acetaminophen without talking to your healthcare provider. Too much acetaminophen may cause liver damage. Prescription pain medicine may cause constipation. Ask your healthcare provider how to prevent or treat constipation.       •Take your medicine as directed. Contact your healthcare provider if you think your medicine is not helping or if you have side effects. Tell your provider if you are allergic to any medicine. Keep a list of the medicines, vitamins, and herbs you take. Include the amounts, and when and why you take them. Bring the list or the pill bottles to follow-up visits. Carry your medicine list with you in case of an emergency.      Manage ovarian cysts: You can manage a current cyst and help healthcare providers find future cysts early.  •Apply heat to decrease pain and cramping from a cyst. Sit in a warm bath, or place a heating pad (turned on low) on your abdomen. Do this for 15 to 20 minutes every hour for comfort.      •Get regular pelvic exams or Pap smears. This will help providers find any new ovarian cysts. Tell your healthcare provider about any unusual changes in your monthly cycle.      Follow up with your doctor or gynecologist as directed: Write down your questions so you remember to ask them during your visits

## 2024-02-02 NOTE — ED PROVIDER NOTE - PATIENT PORTAL LINK FT
You can access the FollowMyHealth Patient Portal offered by [facility] by registering at the following website: [email or web address]. By joining [employer]’s FollowMyHealth portal, you will also be able to view your health information using other applications (apps) compatible with our system.

## 2024-02-03 VITALS
SYSTOLIC BLOOD PRESSURE: 141 MMHG | TEMPERATURE: 98 F | RESPIRATION RATE: 18 BRPM | OXYGEN SATURATION: 100 % | HEART RATE: 84 BPM | DIASTOLIC BLOOD PRESSURE: 101 MMHG

## 2024-04-03 NOTE — ED PROVIDER NOTE - CARE PROVIDER_API CALL
None
Jose Angel Baron  Obstetrics and Gynecology  [address] [place] [address], [address]  [place] [address]  Phone: [phone]  Fax: [phone]  Follow Up Time: 4-6 Days

## 2025-02-04 NOTE — ED ADULT NURSE NOTE - NS ED NOTE ABUSE SUSPICION NEGLECT YN
Referral received for patient.  Patient info is outlined below.    Referring provider: Garrett Henderson MD   Referring provider location [facility]  Diagnosis: Cancer of unknown origin  (CMD) [C80.1]   Demographics info received:  yes  Records Received with Referral:  yes  Records Requested: no  Urgency: Routine    Chart to be routed to JUAN Mcmahon [facility] Gyn Onc Nurse Pool for nurse review.    No

## 2025-03-28 ENCOUNTER — NON-APPOINTMENT (OUTPATIENT)
Age: 25
End: 2025-03-28

## 2025-03-28 ENCOUNTER — RESULT REVIEW (OUTPATIENT)
Age: 25
End: 2025-03-28

## 2025-04-17 NOTE — ED ADULT NURSE NOTE - NS ED NURSE LEVEL OF CONSCIOUSNESS SPEECH
Name of Medication(s) Requested:  Requested Prescriptions     Pending Prescriptions Disp Refills    lisinopril (PRINIVIL;ZESTRIL) 20 MG tablet [Pharmacy Med Name: LISINOPRIL 20MG TABLETS] 90 tablet 0     Sig: TAKE 1 TABLET BY MOUTH DAILY    atorvastatin (LIPITOR) 40 MG tablet [Pharmacy Med Name: ATORVASTATIN 40MG TABLETS] 30 tablet 0     Sig: TAKE 1 TABLET BY MOUTH EVERY NIGHT    traZODone (DESYREL) 50 MG tablet [Pharmacy Med Name: TRAZODONE 50MG TABLETS] 30 tablet 0     Sig: TAKE 1 TABLET BY MOUTH EVERY NIGHT       Medication is on current medication list Yes    Dosage and directions were verified? Yes    Quantity verified: 90 day supply     Pharmacy Verified?  Yes    Last Appointment:  3/6/2025    Future appts:  Future Appointments   Date Time Provider Department Center   6/11/2025 11:45 AM Silvana Braga MD [place] [facility] DEP        (If no appt send self scheduling link. .REFILLAPPT)  Scheduling request sent?     [] Yes  [] No    Does patient need updated?  [] Yes  [] No   Speaking Coherently

## 2025-06-07 ENCOUNTER — NON-APPOINTMENT (OUTPATIENT)
Age: 25
End: 2025-06-07

## 2025-06-09 ENCOUNTER — APPOINTMENT (OUTPATIENT)
Dept: OBGYN | Facility: CLINIC | Age: 25
End: 2025-06-09
Payer: COMMERCIAL

## 2025-06-09 VITALS
TEMPERATURE: 97.9 F | HEART RATE: 100 BPM | DIASTOLIC BLOOD PRESSURE: 80 MMHG | SYSTOLIC BLOOD PRESSURE: 142 MMHG | OXYGEN SATURATION: 98 % | HEIGHT: 62 IN | BODY MASS INDEX: 29.08 KG/M2 | WEIGHT: 158 LBS

## 2025-06-09 PROBLEM — Z01.419 WELL WOMAN EXAM WITH ROUTINE GYNECOLOGICAL EXAM: Status: ACTIVE | Noted: 2025-06-09

## 2025-06-09 PROBLEM — Z30.45 INITIAL ENCOUNTER FOR MANAGEMENT OF CONTRACEPTIVE PATCH USE: Status: ACTIVE | Noted: 2025-06-09

## 2025-06-09 PROBLEM — Z12.4 SCREENING FOR MALIGNANT NEOPLASM OF CERVIX: Status: ACTIVE | Noted: 2025-06-09

## 2025-06-09 PROBLEM — Z11.3 ROUTINE SCREENING FOR STI (SEXUALLY TRANSMITTED INFECTION): Status: ACTIVE | Noted: 2025-06-09

## 2025-06-09 LAB
HCG UR QL: NEGATIVE
QUALITY CONTROL: YES

## 2025-06-09 PROCEDURE — 99395 PREV VISIT EST AGE 18-39: CPT | Mod: 25

## 2025-06-09 PROCEDURE — 99459 PELVIC EXAMINATION: CPT

## 2025-06-09 PROCEDURE — 99204 OFFICE O/P NEW MOD 45 MIN: CPT | Mod: 25

## 2025-06-09 PROCEDURE — 81003 URINALYSIS AUTO W/O SCOPE: CPT | Mod: QW

## 2025-06-09 RX ORDER — NAPROXEN SODIUM 550 MG/1
550 TABLET ORAL
Qty: 60 | Refills: 1 | Status: ACTIVE | COMMUNITY
Start: 2025-06-09 | End: 1900-01-01

## 2025-06-09 RX ORDER — NORELGESTROMIN AND ETHINYL ESTRADIOL 150; 35 UG/D; UG/D
150-35 PATCH TRANSDERMAL
Qty: 12 | Refills: 3 | Status: ACTIVE | COMMUNITY
Start: 2025-06-09 | End: 1900-01-01

## 2025-06-11 LAB
C TRACH RRNA SPEC QL NAA+PROBE: NOT DETECTED
CANDIDA VAG CYTO: NOT DETECTED
G VAGINALIS+PREV SP MTYP VAG QL MICRO: NOT DETECTED
N GONORRHOEA RRNA SPEC QL NAA+PROBE: NOT DETECTED
SOURCE AMPLIFICATION: NORMAL
T VAGINALIS VAG QL WET PREP: NOT DETECTED

## 2025-06-13 LAB — CYTOLOGY CVX/VAG DOC THIN PREP: NORMAL

## 2025-07-02 ENCOUNTER — APPOINTMENT (OUTPATIENT)
Dept: ULTRASOUND IMAGING | Facility: HOSPITAL | Age: 25
End: 2025-07-02
Payer: COMMERCIAL

## 2025-07-02 ENCOUNTER — OUTPATIENT (OUTPATIENT)
Dept: OUTPATIENT SERVICES | Facility: HOSPITAL | Age: 25
LOS: 1 days | End: 2025-07-02
Payer: COMMERCIAL

## 2025-07-02 DIAGNOSIS — N94.6 DYSMENORRHEA, UNSPECIFIED: ICD-10-CM

## 2025-07-02 PROCEDURE — 76830 TRANSVAGINAL US NON-OB: CPT

## 2025-07-02 PROCEDURE — 76856 US EXAM PELVIC COMPLETE: CPT

## 2025-07-02 PROCEDURE — 76830 TRANSVAGINAL US NON-OB: CPT | Mod: 26

## 2025-07-04 ENCOUNTER — EMERGENCY (EMERGENCY)
Facility: HOSPITAL | Age: 25
LOS: 1 days | End: 2025-07-04
Attending: EMERGENCY MEDICINE | Admitting: EMERGENCY MEDICINE
Payer: COMMERCIAL

## 2025-07-04 VITALS
OXYGEN SATURATION: 100 % | TEMPERATURE: 98 F | HEART RATE: 98 BPM | DIASTOLIC BLOOD PRESSURE: 106 MMHG | RESPIRATION RATE: 18 BRPM | SYSTOLIC BLOOD PRESSURE: 151 MMHG | WEIGHT: 179.9 LBS | HEIGHT: 62 IN

## 2025-07-04 PROCEDURE — 99285 EMERGENCY DEPT VISIT HI MDM: CPT

## 2025-07-04 NOTE — ED ADULT NURSE NOTE - OBJECTIVE STATEMENT
Pt is alert, came to the ER due to lower abdominal pain for 2 days, no nausea or vomiting. Pt started her menstrual period 7 days ago, changed pads 3 times today.

## 2025-07-04 NOTE — ED ADULT TRIAGE NOTE - CHIEF COMPLAINT QUOTE
Patient complaint of abdominal pain for the past two days. Patient on her period and had US done two day ago

## 2025-07-05 VITALS
SYSTOLIC BLOOD PRESSURE: 132 MMHG | DIASTOLIC BLOOD PRESSURE: 86 MMHG | RESPIRATION RATE: 18 BRPM | OXYGEN SATURATION: 96 % | HEART RATE: 88 BPM

## 2025-07-05 LAB
ALBUMIN SERPL ELPH-MCNC: 3.2 G/DL — LOW (ref 3.3–5)
ALP SERPL-CCNC: 93 U/L — SIGNIFICANT CHANGE UP (ref 40–120)
ALT FLD-CCNC: 20 U/L — SIGNIFICANT CHANGE UP (ref 10–45)
ANION GAP SERPL CALC-SCNC: 10 MMOL/L — SIGNIFICANT CHANGE UP (ref 5–17)
APPEARANCE UR: CLEAR — SIGNIFICANT CHANGE UP
AST SERPL-CCNC: 19 U/L — SIGNIFICANT CHANGE UP (ref 10–40)
BACTERIA # UR AUTO: NEGATIVE /HPF — SIGNIFICANT CHANGE UP
BASOPHILS # BLD AUTO: 0.06 K/UL — SIGNIFICANT CHANGE UP (ref 0–0.2)
BASOPHILS NFR BLD AUTO: 0.5 % — SIGNIFICANT CHANGE UP (ref 0–2)
BILIRUB SERPL-MCNC: 0.2 MG/DL — SIGNIFICANT CHANGE UP (ref 0.2–1.2)
BILIRUB UR-MCNC: NEGATIVE — SIGNIFICANT CHANGE UP
BUN SERPL-MCNC: 9 MG/DL — SIGNIFICANT CHANGE UP (ref 7–23)
CALCIUM SERPL-MCNC: 9.3 MG/DL — SIGNIFICANT CHANGE UP (ref 8.4–10.5)
CHLORIDE SERPL-SCNC: 105 MMOL/L — SIGNIFICANT CHANGE UP (ref 96–108)
CO2 SERPL-SCNC: 25 MMOL/L — SIGNIFICANT CHANGE UP (ref 22–31)
COLOR SPEC: YELLOW — SIGNIFICANT CHANGE UP
COMMENT - URINE: SIGNIFICANT CHANGE UP
CREAT SERPL-MCNC: 0.37 MG/DL — LOW (ref 0.5–1.3)
DIFF PNL FLD: ABNORMAL
EGFR: 146 ML/MIN/1.73M2 — SIGNIFICANT CHANGE UP
EGFR: 146 ML/MIN/1.73M2 — SIGNIFICANT CHANGE UP
EOSINOPHIL # BLD AUTO: 0.13 K/UL — SIGNIFICANT CHANGE UP (ref 0–0.5)
EOSINOPHIL NFR BLD AUTO: 1 % — SIGNIFICANT CHANGE UP (ref 0–6)
EPI CELLS # UR: PRESENT
GLUCOSE SERPL-MCNC: 83 MG/DL — SIGNIFICANT CHANGE UP (ref 70–99)
GLUCOSE UR QL: NEGATIVE MG/DL — SIGNIFICANT CHANGE UP
HCG SERPL-ACNC: <1 MIU/ML — SIGNIFICANT CHANGE UP
HCT VFR BLD CALC: 33.7 % — LOW (ref 34.5–45)
HGB BLD-MCNC: 10.8 G/DL — LOW (ref 11.5–15.5)
IMM GRANULOCYTES NFR BLD AUTO: 0.4 % — SIGNIFICANT CHANGE UP (ref 0–0.9)
KETONES UR QL: NEGATIVE MG/DL — SIGNIFICANT CHANGE UP
LEUKOCYTE ESTERASE UR-ACNC: NEGATIVE — SIGNIFICANT CHANGE UP
LIDOCAIN IGE QN: 29 U/L — SIGNIFICANT CHANGE UP (ref 16–77)
LYMPHOCYTES # BLD AUTO: 19.7 % — SIGNIFICANT CHANGE UP (ref 13–44)
LYMPHOCYTES # BLD AUTO: 2.61 K/UL — SIGNIFICANT CHANGE UP (ref 1–3.3)
MCHC RBC-ENTMCNC: 23.5 PG — LOW (ref 27–34)
MCHC RBC-ENTMCNC: 32 G/DL — SIGNIFICANT CHANGE UP (ref 32–36)
MCV RBC AUTO: 73.3 FL — LOW (ref 80–100)
MONOCYTES # BLD AUTO: 0.96 K/UL — HIGH (ref 0–0.9)
MONOCYTES NFR BLD AUTO: 7.3 % — SIGNIFICANT CHANGE UP (ref 2–14)
NEUTROPHILS # BLD AUTO: 9.41 K/UL — HIGH (ref 1.8–7.4)
NEUTROPHILS NFR BLD AUTO: 71.1 % — SIGNIFICANT CHANGE UP (ref 43–77)
NITRITE UR-MCNC: NEGATIVE — SIGNIFICANT CHANGE UP
NRBC BLD AUTO-RTO: 0 /100 WBCS — SIGNIFICANT CHANGE UP (ref 0–0)
PH UR: 6.5 — SIGNIFICANT CHANGE UP (ref 5–8)
PLATELET # BLD AUTO: 339 K/UL — SIGNIFICANT CHANGE UP (ref 150–400)
POTASSIUM SERPL-MCNC: 4 MMOL/L — SIGNIFICANT CHANGE UP (ref 3.5–5.3)
POTASSIUM SERPL-SCNC: 4 MMOL/L — SIGNIFICANT CHANGE UP (ref 3.5–5.3)
PROT SERPL-MCNC: 8 G/DL — SIGNIFICANT CHANGE UP (ref 6–8.3)
PROT UR-MCNC: 30 MG/DL
RBC # BLD: 4.6 M/UL — SIGNIFICANT CHANGE UP (ref 3.8–5.2)
RBC # FLD: 15.9 % — HIGH (ref 10.3–14.5)
RBC CASTS # UR COMP ASSIST: >50 /HPF — HIGH (ref 0–4)
SODIUM SERPL-SCNC: 140 MMOL/L — SIGNIFICANT CHANGE UP (ref 135–145)
SP GR SPEC: 1.02 — SIGNIFICANT CHANGE UP (ref 1–1.03)
UROBILINOGEN FLD QL: 1 MG/DL — SIGNIFICANT CHANGE UP (ref 0.2–1)
WBC # BLD: 12.7 K/UL — HIGH (ref 3.8–10.5)
WBC # FLD AUTO: 12.7 K/UL — HIGH (ref 3.8–10.5)
WBC UR QL: 2 /HPF — SIGNIFICANT CHANGE UP (ref 0–5)

## 2025-07-05 PROCEDURE — 80053 COMPREHEN METABOLIC PANEL: CPT

## 2025-07-05 PROCEDURE — 74177 CT ABD & PELVIS W/CONTRAST: CPT | Mod: 26

## 2025-07-05 PROCEDURE — 83690 ASSAY OF LIPASE: CPT

## 2025-07-05 PROCEDURE — 96374 THER/PROPH/DIAG INJ IV PUSH: CPT | Mod: XU

## 2025-07-05 PROCEDURE — 99284 EMERGENCY DEPT VISIT MOD MDM: CPT | Mod: 25

## 2025-07-05 PROCEDURE — 84702 CHORIONIC GONADOTROPIN TEST: CPT

## 2025-07-05 PROCEDURE — 96375 TX/PRO/DX INJ NEW DRUG ADDON: CPT

## 2025-07-05 PROCEDURE — 36415 COLL VENOUS BLD VENIPUNCTURE: CPT

## 2025-07-05 PROCEDURE — 81001 URINALYSIS AUTO W/SCOPE: CPT

## 2025-07-05 PROCEDURE — 85025 COMPLETE CBC W/AUTO DIFF WBC: CPT

## 2025-07-05 PROCEDURE — 74177 CT ABD & PELVIS W/CONTRAST: CPT

## 2025-07-05 RX ORDER — ONDANSETRON HCL/PF 4 MG/2 ML
4 VIAL (ML) INJECTION ONCE
Refills: 0 | Status: COMPLETED | OUTPATIENT
Start: 2025-07-05 | End: 2025-07-05

## 2025-07-05 RX ADMIN — Medication 1000 MILLILITER(S): at 01:59

## 2025-07-05 RX ADMIN — Medication 4 MILLIGRAM(S): at 02:29

## 2025-07-05 RX ADMIN — Medication 4 MILLIGRAM(S): at 02:01

## 2025-07-05 RX ADMIN — Medication 4 MILLIGRAM(S): at 01:59

## 2025-07-05 NOTE — ED PROVIDER NOTE - OBJECTIVE STATEMENT
24-year-old female presents to the emerged from today with lower abdominal pain.  Patient has been having pain off and on for a few days but got worse today.  Went for an ultrasound on July 2 of her pelvis which was unremarkable.  There is a period that just ended and then started again today with some clots and crampy abdominal pain.  No dysuria hematuria no nausea vomiting diarrhea

## 2025-07-05 NOTE — ED PROVIDER NOTE - PHYSICAL EXAMINATION
Vitals: I have reviewed the patients vital signs  General: nontoxic appearing  HEENT: Atraumatic, normocephalic, airway patent  Eyes: EOMI, tracking appropriately  Neck: no tracheal deviation  Chest/Lungs: no trauma, symmetric chest rise, speaking in complete sentences,  no resp distress  Heart: skin and extremities well perfused, regular rate and rhythm  Neuro: A+Ox3, appears non focal  MSK: no deformities  Skin: no cyanosis, no jaundice   Psych:  Normal mood and affect  Abdomen: Soft tender to palpation suprapubic no rebound no guarding

## 2025-07-05 NOTE — ED PROVIDER NOTE - PROGRESS NOTE DETAILS
Patient feeling better at this time.  The CAT scan did not show any obvious pathology.  Laboratory studies are likewise unremarkable except for blood in the urine which goes along with her vaginal bleeding.  I reviewed the results of her pelvic ultrasound which were negative as well.  Will discharge the patient home at this time

## 2025-07-05 NOTE — ED PROVIDER NOTE - CLINICAL SUMMARY MEDICAL DECISION MAKING FREE TEXT BOX
Patient presenting with suprapubic abdominal pain.  She has no other medical problems that require intervention at this time.  Differential includes but is not limited to UTI, dysfunctional uterine bleeding, ovarian pathology, appendicitis, diverticulitis.  The patient has had a negative pelvic ultrasound with no cyst that takes ovarian pathology lower on the differential.  I am going to get a CT scan treat pain symptomatically and reassess

## 2025-07-05 NOTE — ED PROVIDER NOTE - PATIENT PORTAL LINK FT
You can access the FollowMyHealth Patient Portal offered by [facility] by registering at the following website: [email or web address]. By joining [employer]’s FollowMyHealth portal, you will also be able to view your health information using other applications (apps) compatible with our system.

## 2025-07-08 ENCOUNTER — EMERGENCY (EMERGENCY)
Facility: HOSPITAL | Age: 25
LOS: 1 days | End: 2025-07-08
Attending: EMERGENCY MEDICINE | Admitting: EMERGENCY MEDICINE
Payer: COMMERCIAL

## 2025-07-08 VITALS
DIASTOLIC BLOOD PRESSURE: 100 MMHG | SYSTOLIC BLOOD PRESSURE: 138 MMHG | RESPIRATION RATE: 17 BRPM | HEART RATE: 88 BPM | OXYGEN SATURATION: 98 %

## 2025-07-08 VITALS
DIASTOLIC BLOOD PRESSURE: 113 MMHG | HEART RATE: 92 BPM | RESPIRATION RATE: 18 BRPM | SYSTOLIC BLOOD PRESSURE: 160 MMHG | OXYGEN SATURATION: 97 % | TEMPERATURE: 98 F | WEIGHT: 179.9 LBS | HEIGHT: 62 IN

## 2025-07-08 LAB
ANION GAP SERPL CALC-SCNC: 11 MMOL/L — SIGNIFICANT CHANGE UP (ref 5–17)
APTT BLD: 28.6 SEC — SIGNIFICANT CHANGE UP (ref 26.1–36.8)
BASOPHILS # BLD AUTO: 0.04 K/UL — SIGNIFICANT CHANGE UP (ref 0–0.2)
BASOPHILS NFR BLD AUTO: 0.4 % — SIGNIFICANT CHANGE UP (ref 0–2)
BUN SERPL-MCNC: 10 MG/DL — SIGNIFICANT CHANGE UP (ref 7–23)
CALCIUM SERPL-MCNC: 8.7 MG/DL — SIGNIFICANT CHANGE UP (ref 8.4–10.5)
CHLORIDE SERPL-SCNC: 104 MMOL/L — SIGNIFICANT CHANGE UP (ref 96–108)
CO2 SERPL-SCNC: 23 MMOL/L — SIGNIFICANT CHANGE UP (ref 22–31)
CREAT SERPL-MCNC: 0.55 MG/DL — SIGNIFICANT CHANGE UP (ref 0.5–1.3)
EGFR: 133 ML/MIN/1.73M2 — SIGNIFICANT CHANGE UP
EGFR: 133 ML/MIN/1.73M2 — SIGNIFICANT CHANGE UP
EOSINOPHIL # BLD AUTO: 0.1 K/UL — SIGNIFICANT CHANGE UP (ref 0–0.5)
EOSINOPHIL NFR BLD AUTO: 1 % — SIGNIFICANT CHANGE UP (ref 0–6)
GLUCOSE SERPL-MCNC: 93 MG/DL — SIGNIFICANT CHANGE UP (ref 70–99)
HCG SERPL-ACNC: <1 MIU/ML — SIGNIFICANT CHANGE UP
HCT VFR BLD CALC: 30 % — LOW (ref 34.5–45)
HGB BLD-MCNC: 9.6 G/DL — LOW (ref 11.5–15.5)
IMM GRANULOCYTES NFR BLD AUTO: 0.6 % — SIGNIFICANT CHANGE UP (ref 0–0.9)
INR BLD: 0.98 RATIO — SIGNIFICANT CHANGE UP (ref 0.85–1.16)
LYMPHOCYTES # BLD AUTO: 1.91 K/UL — SIGNIFICANT CHANGE UP (ref 1–3.3)
LYMPHOCYTES # BLD AUTO: 19.6 % — SIGNIFICANT CHANGE UP (ref 13–44)
MCHC RBC-ENTMCNC: 23.4 PG — LOW (ref 27–34)
MCHC RBC-ENTMCNC: 32 G/DL — SIGNIFICANT CHANGE UP (ref 32–36)
MCV RBC AUTO: 73 FL — LOW (ref 80–100)
MONOCYTES # BLD AUTO: 0.72 K/UL — SIGNIFICANT CHANGE UP (ref 0–0.9)
MONOCYTES NFR BLD AUTO: 7.4 % — SIGNIFICANT CHANGE UP (ref 2–14)
NEUTROPHILS # BLD AUTO: 6.93 K/UL — SIGNIFICANT CHANGE UP (ref 1.8–7.4)
NEUTROPHILS NFR BLD AUTO: 71 % — SIGNIFICANT CHANGE UP (ref 43–77)
NRBC BLD AUTO-RTO: 0 /100 WBCS — SIGNIFICANT CHANGE UP (ref 0–0)
PLATELET # BLD AUTO: 326 K/UL — SIGNIFICANT CHANGE UP (ref 150–400)
POTASSIUM SERPL-MCNC: 3.4 MMOL/L — LOW (ref 3.5–5.3)
POTASSIUM SERPL-SCNC: 3.4 MMOL/L — LOW (ref 3.5–5.3)
PROTHROM AB SERPL-ACNC: 11.6 SEC — SIGNIFICANT CHANGE UP (ref 9.9–13.4)
RBC # BLD: 4.11 M/UL — SIGNIFICANT CHANGE UP (ref 3.8–5.2)
RBC # FLD: 16 % — HIGH (ref 10.3–14.5)
SODIUM SERPL-SCNC: 138 MMOL/L — SIGNIFICANT CHANGE UP (ref 135–145)
WBC # BLD: 9.76 K/UL — SIGNIFICANT CHANGE UP (ref 3.8–10.5)
WBC # FLD AUTO: 9.76 K/UL — SIGNIFICANT CHANGE UP (ref 3.8–10.5)

## 2025-07-08 PROCEDURE — 85610 PROTHROMBIN TIME: CPT

## 2025-07-08 PROCEDURE — 36415 COLL VENOUS BLD VENIPUNCTURE: CPT

## 2025-07-08 PROCEDURE — 85730 THROMBOPLASTIN TIME PARTIAL: CPT

## 2025-07-08 PROCEDURE — 99283 EMERGENCY DEPT VISIT LOW MDM: CPT

## 2025-07-08 PROCEDURE — 99284 EMERGENCY DEPT VISIT MOD MDM: CPT

## 2025-07-08 PROCEDURE — 85025 COMPLETE CBC W/AUTO DIFF WBC: CPT

## 2025-07-08 PROCEDURE — 84702 CHORIONIC GONADOTROPIN TEST: CPT

## 2025-07-08 PROCEDURE — 80048 BASIC METABOLIC PNL TOTAL CA: CPT

## 2025-07-08 RX ADMIN — Medication 1000 MILLILITER(S): at 21:52

## 2025-07-08 NOTE — ED PROVIDER NOTE - CARE PROVIDER_API CALL
Shaneka Morales  Obstetrics & Gynecology  [address] [place] [id number]  Phone: [phone]  Fax: [phone]  Follow Up Time: 1-3 Days

## 2025-07-08 NOTE — ED PROVIDER NOTE - NSFOLLOWUPINSTRUCTIONS_ED_ALL_ED_FT
- Follow-up with your gynecologist this week  - Return for worse bleeding or pain, feeling faint, short of breath or other concerns    Menorrhagia    WHAT YOU NEED TO KNOW:    Menorrhagia is heavy menstrual bleeding for more than 7 days or severe menstrual bleeding for less than 7 days. Your menstrual bleeding and cramping are so heavy that you have trouble doing your usual daily activities. Your monthly period may also occur more often, and you may bleed between periods. Menorrhagia is common in adolescence and around menopause.  Female Reproductive System    DISCHARGE INSTRUCTIONS:    Call your local emergency number (911 in the ) for any of the following:    You have chest pain and shortness of breath.    Your heart is fluttering or beating faster than usual for you.  Return to the emergency department if:    You feel dizzy when you stand.    You feel confused.    You have severe abdominal pain, nausea, and vomiting.    Your skin or the whites of your eyes turn yellow.  Call your doctor or gynecologist if:    You need to change your pad or tampon more than 1 time per hour, for several hours in a row.    You feel more weak and tired than usual.    You have new coldness in your hands and feet.    You have questions or concerns about your condition or care.  Medicines: You may need any of the following:    Iron supplements may be given if your blood iron level decreases because of heavy bleeding.    NSAIDs, such as ibuprofen, help decrease swelling, pain, and fever. NSAIDs can cause stomach bleeding or kidney problems in certain people. If you take blood thinner medicine, always ask your healthcare provider if NSAIDs are safe for you. Always read the medicine label and follow directions.    Hormones help slow or stop your bleeding and make your monthly periods more regular. This medicine may be given as birth control pills or an intrauterine device (IUD).    Take your medicine as directed. Contact your healthcare provider if you think your medicine is not helping or if you have side effects. Tell your provider if you are allergic to any medicine. Keep a list of the medicines, vitamins, and herbs you take. Include the amounts, and when and why you take them. Bring the list or the pill bottles to follow-up visits. Carry your medicine list with you in case of an emergency.  Manage your symptoms:    Keep a supply of pads or tampons with you at all times. If possible, stay close to a bathroom.    Apply heat on your abdomen to decrease pain and cramps. You can use a heating pad on a low setting. Apply heat for 20 to 30 minutes every 2 hours for as many days as directed.  Follow up with your doctor or gynecologist as directed: You may need regular pelvic exams with Pap smears to monitor your condition. Write down your questions so you remember to ask them during your visits.

## 2025-07-08 NOTE — ED PROVIDER NOTE - OBJECTIVE STATEMENT
24-year-old female with history of heavy and irregular periods complaining of heavy vaginal bleeding today.  She states she used 5 pads today already.  She was started on a birth control patch a week and a half ago for the irregular periods and menstruation pains but states that her symptoms felt worse and was seen here July 4 for pelvic pain and heavy bleeding.  She discontinued her patch at day.  She states her periods seem to be slowing down July 4 (LMP July 26 start) but since then the bleeding is starting to become heavy again.  Pelvic pains have been decreasing.  No fever or chills.  No chest pain or shortness of breath.  No dizziness.  She had a CT abdomen pelvis July 5 and also pelvic sonogram July 2 both of which were unremarkable.  Denies any vaginal trauma or pain.  No vaginal discharge

## 2025-07-08 NOTE — ED ADULT NURSE NOTE - NSFALLUNIVINTERV_ED_ALL_ED
Bed/Stretcher in lowest position, wheels locked, appropriate side rails in place/Call bell, personal items and telephone in reach/Instruct patient to call for assistance before getting out of bed/chair/stretcher/Non-slip footwear applied when patient is off stretcher/[place] to call system/Physically safe environment - no spills, clutter or unnecessary equipment/Purposeful proactive rounding/Room/bathroom lighting operational, light cord in reach

## 2025-07-08 NOTE — ED ADULT NURSE NOTE - OBJECTIVE STATEMENT
Pt c/o heavy vaginal bleeding. States she has her period which should of stopped x 2 days ago. used 5 pads today. no fevers or chills

## 2025-07-08 NOTE — ED PROVIDER NOTE - PATIENT PORTAL LINK FT
You can access the FollowMyHealth Patient Portal offered by [facility] by registering at the following website: [email or web address]. By joining [facility]’s FollowMyHealth portal, you will also be able to view your health information using other applications (apps) compatible with our system.

## 2025-07-08 NOTE — ED PROVIDER NOTE - CLINICAL SUMMARY MEDICAL DECISION MAKING FREE TEXT BOX
24-year-old female with history of heavy and irregular periods complaining of heavy vaginal bleeding today.  She states she used 5 pads today already.  She was started on a birth control patch a week and a half ago for the irregular periods and menstruation pains but states that her symptoms felt worse and was seen here July 4 for pelvic pain and heavy bleeding.  She discontinued her patch at day.  She states her periods seem to be slowing down July 4 (LMP July 26 start) but since then the bleeding is starting to become heavy again.  Pelvic pains have been decreasing.  No fever or chills.  No chest pain or shortness of breath.  No dizziness.  She had a CT abdomen pelvis July 5 and also pelvic sonogram July 2 both of which were unremarkable.  Denies any vaginal trauma or pain.  No vaginal discharge    Patient appears in no distress.  Vitals notable for high blood pressure 160/113.  May be due to discomfort/anxiety.  Will reassess.  Abdomen soft nontender.  Reviewed sonogram from July 2, normal pelvic sonogram.  CT abdomen pelvis shows no pelvic pathology.  Will recheck labs, rule out significant blood loss.  Otherwise will need continued outpatient follow-up with GYN for management of metromenorrhagia 24-year-old female with history of heavy and irregular periods complaining of heavy vaginal bleeding today.  She states she used 5 pads today already.  She was started on a birth control patch a week and a half ago for the irregular periods and menstruation pains but states that her symptoms felt worse and was seen here July 4 for pelvic pain and heavy bleeding.  She discontinued her patch at day.  She states her periods seem to be slowing down July 4 (LMP July 26 start) but since then the bleeding is starting to become heavy again.  Pelvic pains have been decreasing.  No fever or chills.  No chest pain or shortness of breath.  No dizziness.  She had a CT abdomen pelvis July 5 and also pelvic sonogram July 2 both of which were unremarkable.  Denies any vaginal trauma or pain.  No vaginal discharge    Patient appears in no distress.  Vitals notable for high blood pressure 160/113.  May be due to discomfort/anxiety.  Will reassess.  Abdomen soft nontender.  Reviewed sonogram from July 2, normal pelvic sonogram.  CT abdomen pelvis shows no pelvic pathology.  Will recheck labs, rule out significant blood loss.  Otherwise will need continued outpatient follow-up with GYN for management of metromenorrhagia    Hemoglobin 9.6, marginally lower compared to July 4.  Hemodynamically stable.  Recommend close follow-up with GYN.  Return for worsening

## 2025-07-08 NOTE — ED PROVIDER NOTE - PHYSICAL EXAMINATION
exam:   General: well appearing, NAD.   HEENT: eyes perrl, nose normal, OP no erythema/exudate/swelling. pink conjunctiva  cor: RRR, s1s2, 2+rad pulses.   lungs: ctabl, no resp distress.   abd: soft, ntnd.   : no cvat  neuro: a&ox3, cn2-12 intact, COLINDRES, 5/5 strength c nl sensation all extremities, nl coordination.   MSK: no extremity swelling.  Skin: normal, no rash

## 2025-07-10 ENCOUNTER — APPOINTMENT (OUTPATIENT)
Dept: OBGYN | Facility: CLINIC | Age: 25
End: 2025-07-10
Payer: COMMERCIAL

## 2025-07-10 VITALS
DIASTOLIC BLOOD PRESSURE: 82 MMHG | SYSTOLIC BLOOD PRESSURE: 124 MMHG | BODY MASS INDEX: 33.13 KG/M2 | HEART RATE: 88 BPM | WEIGHT: 180 LBS | TEMPERATURE: 97.4 F | HEIGHT: 62 IN | OXYGEN SATURATION: 98 %

## 2025-07-10 PROBLEM — E28.2 PCOS (POLYCYSTIC OVARIAN SYNDROME): Status: ACTIVE | Noted: 2025-07-10

## 2025-07-10 PROBLEM — N92.1 MENORRHAGIA WITH IRREGULAR CYCLE: Status: ACTIVE | Noted: 2025-07-10

## 2025-07-10 PROCEDURE — 81025 URINE PREGNANCY TEST: CPT

## 2025-07-10 PROCEDURE — 99214 OFFICE O/P EST MOD 30 MIN: CPT | Mod: 25

## 2025-07-11 LAB
HCG UR QL: NEGATIVE
QUALITY CONTROL: YES

## 2025-07-14 ENCOUNTER — NON-APPOINTMENT (OUTPATIENT)
Age: 25
End: 2025-07-14

## 2025-08-25 ENCOUNTER — APPOINTMENT (OUTPATIENT)
Dept: OBGYN | Facility: CLINIC | Age: 25
End: 2025-08-25

## 2025-09-03 ENCOUNTER — NON-APPOINTMENT (OUTPATIENT)
Age: 25
End: 2025-09-03